# Patient Record
Sex: FEMALE | Race: WHITE | URBAN - METROPOLITAN AREA
[De-identification: names, ages, dates, MRNs, and addresses within clinical notes are randomized per-mention and may not be internally consistent; named-entity substitution may affect disease eponyms.]

---

## 2017-02-21 ENCOUNTER — OFFICE VISIT (OUTPATIENT)
Dept: FAMILY MEDICINE | Facility: CLINIC | Age: 57
End: 2017-02-21

## 2017-02-21 VITALS
DIASTOLIC BLOOD PRESSURE: 80 MMHG | WEIGHT: 145.4 LBS | SYSTOLIC BLOOD PRESSURE: 120 MMHG | HEART RATE: 66 BPM | BODY MASS INDEX: 24.82 KG/M2 | OXYGEN SATURATION: 97 % | HEIGHT: 64 IN | TEMPERATURE: 98 F

## 2017-02-21 DIAGNOSIS — Z01.818 PREOP GENERAL PHYSICAL EXAM: Primary | ICD-10-CM

## 2017-02-21 DIAGNOSIS — G56.03 BILATERAL CARPAL TUNNEL SYNDROME: ICD-10-CM

## 2017-02-21 LAB — HEMOGLOBIN: 13.7 G/DL (ref 11.7–15.7)

## 2017-02-21 PROCEDURE — 36415 COLL VENOUS BLD VENIPUNCTURE: CPT | Performed by: PHYSICIAN ASSISTANT

## 2017-02-21 PROCEDURE — 99203 OFFICE O/P NEW LOW 30 MIN: CPT | Performed by: PHYSICIAN ASSISTANT

## 2017-02-21 PROCEDURE — 85018 HEMOGLOBIN: CPT | Performed by: PHYSICIAN ASSISTANT

## 2017-02-21 NOTE — NURSING NOTE
Aby Baum is here for pre-op exam for left wrist carpal tunnel     Pre-Visit Screening :  Immunizations : up to date    Colonoscopy : is due and to be scheduled by patient for later completion  Mammogram : is due and to be scheduled by patient for later completion  Asthma Action Test/Plan : NA  PHQ9/GAD7 :  NA

## 2017-02-21 NOTE — PROGRESS NOTES
[facility]  [address]  [address]  [facility] [address]  [phone]  Dept: [phone]    PRE-OP EVALUATION:  Today's date: 2017    Aby Baum (: 1960) presents for pre-operative evaluation assessment as requested by Dr. Oly iJmenez .  She requires evaluation and anesthesia risk assessment prior to undergoing surgery/procedure for treatment of :    17 Left Wrist Carpal Tunnel   Release  3/10/17 is right Carpal Tunnel release and right elbow nerve decompression      Date of Surgery/ Procedure: 2017  Time of Surgery/ Procedure: AM  Hospital/Surgical Facility: [facility]  Fax: [phone]  Primary Physician: Bel Ruvalcaba  Type of Anesthesia Anticipated: to be determined    Patient has a Health Care Directive or Living Will:  YES     2. NO - Do you ever have any pain or discomfort in your chest?  3. NO - Do you have a history of  Heart Failure?  4. NO - Are you troubled by shortness of breath when: walking on the level, up a slight hill or at night?  5. NO - Do you currently have a cold, bronchitis or other respiratory infection?  6. NO - Do you have a cough, shortness of breath or wheezing?  7. NO - Do you sometimes get pains in the calves of your legs when you walk?  8. NO - Do you or anyone in your family have previous history of blood clots?  9. NO - Do you or does anyone in your family have a serious bleeding problem such as prolonged bleeding following surgeries or cuts?  10. NO - Have you ever had problems with anemia or been told to take iron pills?  11. NO - Have you had any abnormal blood loss such as black, tarry or bloody stools, or abnormal vaginal bleeding?  12. NO - Have you ever had a blood transfusion?  13. NO - Have you or any of your relatives ever had problems with anesthesia?  14. NO - Do you have sleep apnea, excessive snoring or daytime drowsiness?  15. NO - Do you have any prosthetic  heart valves?  16. NO - Do you have prosthetic joints?  17. NO - Is there any chance that you may be pregnant?      HPI:                                                      Brief HPI related to upcoming procedure:     Toward end of Oct - pain worsening with carpal tunnel   Hx of bilateral carpal tunnel for many years        See problem list for active medical problems.  Problems all longstanding and stable, except as noted/documented.  See ROS for pertinent symptoms related to these conditions.                                                                                                  .    MEDICAL HISTORY:                                                      Patient Active Problem List    Diagnosis Date Noted     Health Care Home 01/25/2013     Priority: Medium     State Tier Level:  Tier 0  Status:  NA  Care Coordinator:      See Letters for [facility] Care Plan           Living will, counseling/discussion 01/25/2013     Priority: Medium     .       Family history of malignant neoplasm of gastrointestinal tract      Priority: Medium     Father and P Uncle        Past Medical History   Diagnosis Date     Family history of malignant neoplasm of gastrointestinal tract      P Uncle, Father     Fibroids      Past Surgical History   Procedure Laterality Date     C nonspecific procedure  1970     Ganglion cyst removed from R wrist     C total abdom hysterectomy  3/2004     Hysterectomy, Total Abdominal   BSO     Hc colonoscopy thru stoma, diagnostic  2006     normal     No current outpatient prescriptions on file.     OTC products: none    Allergies   Allergen Reactions     Penicillins       Latex Allergy: NO    Social History   Substance Use Topics     Smoking status: Former Smoker     Years: 19.00     Quit date: 9/1/1996     Smokeless tobacco: Never Used     Alcohol use 1.0 oz/week     2 Glasses of wine per week      Comment: 1-2 glasses of wine per week     History   Drug Use No       REVIEW OF SYSTEMS:                   "                                  Constitutional, neuro, ENT, endocrine, pulmonary, cardiac, gastrointestinal, genitourinary, musculoskeletal, integument and psychiatric systems are negative, except as otherwise noted.    EXAM:                                                    /80 (BP Location: Right arm, Patient Position: Chair, Cuff Size: Adult Regular)  Pulse 66  Temp 98  F (36.7  C) (Oral)  Ht 1.613 m (5' 3.5\")  Wt 66 kg (145 lb 6.4 oz)  LMP 03/06/2004  SpO2 97%  BMI 25.35 kg/m2    GENERAL APPEARANCE: healthy, alert and no distress     EYES: EOMI,- PERRL     HENT: ear canals and TM's normal and nose and mouth without ulcers or lesions     NECK: no adenopathy, no asymmetry, masses, or scars and thyroid normal to palpation     RESP: lungs clear to auscultation - no rales, rhonchi or wheezes     CV: regular rates and rhythm, normal S1 S2, no S3 or S4 and no murmur, click or rub -     ABDOMEN:  soft, nontender, no HSM or masses and bowel sounds normal     MS: extremities normal- no gross deformities noted, no evidence of inflammation in joints, FROM in all extremities.     SKIN: no suspicious lesions or rashes     NEURO: Normal strength and tone, sensory exam grossly normal, mentation intact and speech normal     PSYCH: mentation appears normal. and affect normal/bright    DIAGNOSTICS:                                                      EKG: Not indicated due to non-vascular surgery and low risk of event (age <65 and without cardiac risk factors)  Labs Resulted Today:   Results for orders placed or performed in visit on 02/21/17   HEMOGLOBIN   Result Value Ref Range    Hemoglobin 13.7 11.7 - 15.7 g/dL       Recent Labs   Lab Test  07/12/11   1140  07/10/10   0400  07/09/10   0949  07/01/09   0600   HGB  13.8   --   12.8   --    NA   --   136   --   138   POTASSIUM   --   4.8   --   5.5*   CR   --   0.85   --   0.77        IMPRESSION:                                                    Reason for " surgery/procedure: bilateral carpal tunnel release and right nerve decompression at elbow  Diagnosis/reason for consult:  Preoperative clearance      The proposed surgical procedure is considered INTERMEDIATE risk.    REVISED CARDIAC RISK INDEX  The patient has the following serious cardiovascular risks for perioperative complications such as (MI, PE, VFib and 3  AV Block):  No serious cardiac risks  INTERPRETATION: 0 risks: Class I (very low risk - 0.4% complication rate)    The patient has the following additional risks for perioperative complications:  No identified additional risks      ICD-10-CM    1. Preop general physical exam Z01.818 VENOUS COLLECTION     HEMOGLOBIN   2. Bilateral carpal tunnel syndrome G56.03        RECOMMENDATIONS:                                                      APPROVAL GIVEN to proceed with proposed procedure, without further diagnostic evaluation       Signed Electronically by: Bel Ruvalcaba PA-C    Copy of this evaluation report is provided to requesting physician.    [facility] Preop Guidelines

## 2017-02-21 NOTE — MR AVS SNAPSHOT
"              After Visit Summary   2/21/2017    Aby Baum    MRN: [id number]           Patient Information     Date Of Birth          1960        Visit Information        Provider Department      2/21/2017 10:30 AM Bel Ruvalcaba PA [facility]        Today's Diagnoses     Preop general physical exam    -  1    Bilateral carpal tunnel syndrome           Follow-ups after your visit        Who to contact     If you have questions or need follow up information about today's clinic visit or your schedule please contact [facility] directly at [phone].  Normal or non-critical lab and imaging results will be communicated to you by [facility], letter or phone within 4 business days after the clinic has received the results. If you do not hear from us within 7 days, please contact the clinic through [facility] or phone. If you have a critical or abnormal lab result, we will notify you by phone as soon as possible.  Submit refill requests through [facility] or call your pharmacy and they will forward the refill request to us. Please allow 3 business days for your refill to be completed.          Additional Information About Your Visit        MyChart Information     [facility] gives you secure access to your electronic health record. If you see a primary care provider, you can also send messages to your care team and make appointments. If you have questions, please call your primary care clinic.  If you do not have a primary care provider, please call [phone] and they will assist you.        Care EveryWhere ID     This is your Care EveryWhere ID. This could be used by other organizations to access your [place] medical records  [id number]        Your Vitals Were     Pulse Temperature Height Last Period Pulse Oximetry BMI (Body Mass Index)    66 98  F (36.7  C) (Oral) 1.613 m (5' 3.5\") 03/06/2004 97% 25.35 kg/m2       Blood Pressure from Last 3 " Encounters:   02/21/17 120/80   04/09/13 108/60   03/05/13 122/78    Weight from Last 3 Encounters:   02/21/17 66 kg (145 lb 6.4 oz)   04/09/13 66.2 kg (146 lb)   03/05/13 66.2 kg (146 lb)              We Performed the Following     HEMOGLOBIN     VENOUS COLLECTION        Primary Care Provider Office Phone # Fax #    MT Rossi [phone]       [facility]  [address]  [facility] [address]        Thank you!     Thank you for choosing [facility]  for your care. Our goal is always to provide you with excellent care. Hearing back from our patients is one way we can continue to improve our services. Please take a few minutes to complete the written survey that you may receive in the mail after your visit with us. Thank you!             Your Updated Medication List - Protect others around you: Learn how to safely use, store and throw away your medicines at www.disposemymeds.org.      Notice  As of 2/21/2017 11:18 AM    You have not been prescribed any medications.

## 2017-02-21 NOTE — LETTER
[facility]  [address]  [address]  [facility] [address]  [phone]  Dept: [phone]    PRE-OP EVALUATION:  Today's date: 2017    Aby Baum (: 1960) presents for pre-operative evaluation assessment as requested by Dr. Oly Jimenez .  She requires evaluation and anesthesia risk assessment prior to undergoing surgery/procedure for treatment of :    17 Left Wrist Carpal Tunnel   Release  3/10/17 is right Carpal Tunnel release and right elbow nerve decompression      Date of Surgery/ Procedure: 2017  Time of Surgery/ Procedure: AM  Hospital/Surgical Facility: [facility]  Fax: [phone]  Primary Physician: Bel Ruvalcaba  Type of Anesthesia Anticipated: to be determined    Patient has a Health Care Directive or Living Will:  YES     2. NO - Do you ever have any pain or discomfort in your chest?  3. NO - Do you have a history of  Heart Failure?  4. NO - Are you troubled by shortness of breath when: walking on the level, up a slight hill or at night?  5. NO - Do you currently have a cold, bronchitis or other respiratory infection?  6. NO - Do you have a cough, shortness of breath or wheezing?  7. NO - Do you sometimes get pains in the calves of your legs when you walk?  8. NO - Do you or anyone in your family have previous history of blood clots?  9. NO - Do you or does anyone in your family have a serious bleeding problem such as prolonged bleeding following surgeries or cuts?  10. NO - Have you ever had problems with anemia or been told to take iron pills?  11. NO - Have you had any abnormal blood loss such as black, tarry or bloody stools, or abnormal vaginal bleeding?  12. NO - Have you ever had a blood transfusion?  13. NO - Have you or any of your relatives ever had problems with anesthesia?  14. NO - Do you have sleep apnea, excessive snoring or daytime drowsiness?  15. NO - Do you have any prosthetic  heart valves?  16. NO - Do you have prosthetic joints?  17. NO - Is there any chance that you may be pregnant?      HPI:                                                      Brief HPI related to upcoming procedure:     Toward end of Oct - pain worsening with carpal tunnel   Hx of bilateral carpal tunnel for many years        See problem list for active medical problems.  Problems all longstanding and stable, except as noted/documented.  See ROS for pertinent symptoms related to these conditions.                                                                                                  .    MEDICAL HISTORY:                                                      Patient Active Problem List    Diagnosis Date Noted     Health Care Home 01/25/2013     Priority: Medium     State Tier Level:  Tier 0  Status:  NA  Care Coordinator:      See Letters for [facility] Care Plan           Living will, counseling/discussion 01/25/2013     Priority: Medium     .       Family history of malignant neoplasm of gastrointestinal tract      Priority: Medium     Father and P Uncle        Past Medical History   Diagnosis Date     Family history of malignant neoplasm of gastrointestinal tract      P Uncle, Father     Fibroids      Past Surgical History   Procedure Laterality Date     C nonspecific procedure  1970     Ganglion cyst removed from R wrist     C total abdom hysterectomy  3/2004     Hysterectomy, Total Abdominal   BSO     Hc colonoscopy thru stoma, diagnostic  2006     normal     No current outpatient prescriptions on file.     OTC products: none    Allergies   Allergen Reactions     Penicillins       Latex Allergy: NO    Social History   Substance Use Topics     Smoking status: Former Smoker     Years: 19.00     Quit date: 9/1/1996     Smokeless tobacco: Never Used     Alcohol use 1.0 oz/week     2 Glasses of wine per week      Comment: 1-2 glasses of wine per week     History   Drug Use No       REVIEW OF SYSTEMS:                   "                                  Constitutional, neuro, ENT, endocrine, pulmonary, cardiac, gastrointestinal, genitourinary, musculoskeletal, integument and psychiatric systems are negative, except as otherwise noted.    EXAM:                                                    /80 (BP Location: Right arm, Patient Position: Chair, Cuff Size: Adult Regular)  Pulse 66  Temp 98  F (36.7  C) (Oral)  Ht 1.613 m (5' 3.5\")  Wt 66 kg (145 lb 6.4 oz)  LMP 03/06/2004  SpO2 97%  BMI 25.35 kg/m2    GENERAL APPEARANCE: healthy, alert and no distress     EYES: EOMI,- PERRL     HENT: ear canals and TM's normal and nose and mouth without ulcers or lesions     NECK: no adenopathy, no asymmetry, masses, or scars and thyroid normal to palpation     RESP: lungs clear to auscultation - no rales, rhonchi or wheezes     CV: regular rates and rhythm, normal S1 S2, no S3 or S4 and no murmur, click or rub -     ABDOMEN:  soft, nontender, no HSM or masses and bowel sounds normal     MS: extremities normal- no gross deformities noted, no evidence of inflammation in joints, FROM in all extremities.     SKIN: no suspicious lesions or rashes     NEURO: Normal strength and tone, sensory exam grossly normal, mentation intact and speech normal     PSYCH: mentation appears normal. and affect normal/bright    DIAGNOSTICS:                                                      EKG: Not indicated due to non-vascular surgery and low risk of event (age <65 and without cardiac risk factors)  Labs Resulted Today:   Results for orders placed or performed in visit on 02/21/17   HEMOGLOBIN   Result Value Ref Range    Hemoglobin 13.7 11.7 - 15.7 g/dL       Recent Labs   Lab Test  07/12/11   1140  07/10/10   0400  07/09/10   0949  07/01/09   0600   HGB  13.8   --   12.8   --    NA   --   136   --   138   POTASSIUM   --   4.8   --   5.5*   CR   --   0.85   --   0.77        IMPRESSION:                                                    Reason for " surgery/procedure: bilateral carpal tunnel release and right nerve decompression at elbow  Diagnosis/reason for consult:  Preoperative clearance      The proposed surgical procedure is considered INTERMEDIATE risk.    REVISED CARDIAC RISK INDEX  The patient has the following serious cardiovascular risks for perioperative complications such as (MI, PE, VFib and 3  AV Block):  No serious cardiac risks  INTERPRETATION: 0 risks: Class I (very low risk - 0.4% complication rate)    The patient has the following additional risks for perioperative complications:  No identified additional risks      ICD-10-CM    1. Preop general physical exam Z01.818 VENOUS COLLECTION     HEMOGLOBIN   2. Bilateral carpal tunnel syndrome G56.03        RECOMMENDATIONS:                                                      APPROVAL GIVEN to proceed with proposed procedure, without further diagnostic evaluation       Signed Electronically by: Bel Ruvalcaba PA-C    Copy of this evaluation report is provided to requesting physician.    [facility] Preop Guidelines

## 2017-04-20 ENCOUNTER — TRANSFERRED RECORDS (OUTPATIENT)
Dept: FAMILY MEDICINE | Facility: CLINIC | Age: 57
End: 2017-04-20

## 2017-06-08 ENCOUNTER — TRANSFERRED RECORDS (OUTPATIENT)
Dept: FAMILY MEDICINE | Facility: CLINIC | Age: 57
End: 2017-06-08

## 2017-08-17 ENCOUNTER — TRANSFERRED RECORDS (OUTPATIENT)
Dept: FAMILY MEDICINE | Facility: CLINIC | Age: 57
End: 2017-08-17

## 2017-10-06 ENCOUNTER — HEALTH MAINTENANCE LETTER (OUTPATIENT)
Age: 57
End: 2017-10-06

## 2018-10-12 ENCOUNTER — HEALTH MAINTENANCE LETTER (OUTPATIENT)
Age: 58
End: 2018-10-12

## 2018-12-17 ENCOUNTER — TRANSFERRED RECORDS (OUTPATIENT)
Dept: FAMILY MEDICINE | Facility: CLINIC | Age: 58
End: 2018-12-17

## 2018-12-19 ENCOUNTER — TRANSFERRED RECORDS (OUTPATIENT)
Dept: FAMILY MEDICINE | Facility: CLINIC | Age: 58
End: 2018-12-19

## 2019-01-07 ENCOUNTER — TRANSFERRED RECORDS (OUTPATIENT)
Dept: FAMILY MEDICINE | Facility: CLINIC | Age: 59
End: 2019-01-07

## 2019-12-08 ENCOUNTER — HEALTH MAINTENANCE LETTER (OUTPATIENT)
Age: 59
End: 2019-12-08

## 2020-03-15 ENCOUNTER — HEALTH MAINTENANCE LETTER (OUTPATIENT)
Age: 60
End: 2020-03-15

## 2021-01-09 ENCOUNTER — HEALTH MAINTENANCE LETTER (OUTPATIENT)
Age: 61
End: 2021-01-09

## 2021-05-08 ENCOUNTER — HEALTH MAINTENANCE LETTER (OUTPATIENT)
Age: 61
End: 2021-05-08

## 2021-10-23 ENCOUNTER — HEALTH MAINTENANCE LETTER (OUTPATIENT)
Age: 61
End: 2021-10-23

## 2022-02-12 ENCOUNTER — HEALTH MAINTENANCE LETTER (OUTPATIENT)
Age: 62
End: 2022-02-12

## 2022-06-04 ENCOUNTER — HEALTH MAINTENANCE LETTER (OUTPATIENT)
Age: 62
End: 2022-06-04

## 2022-10-09 ENCOUNTER — HEALTH MAINTENANCE LETTER (OUTPATIENT)
Age: 62
End: 2022-10-09

## 2023-03-25 ENCOUNTER — HEALTH MAINTENANCE LETTER (OUTPATIENT)
Age: 63
End: 2023-03-25

## 2023-06-10 ENCOUNTER — HEALTH MAINTENANCE LETTER (OUTPATIENT)
Age: 63
End: 2023-06-10

## 2024-01-06 ENCOUNTER — HEALTH MAINTENANCE LETTER (OUTPATIENT)
Age: 64
End: 2024-01-06